# Patient Record
(demographics unavailable — no encounter records)

---

## 2024-11-20 NOTE — DISCUSSION/SUMMARY
[Normal Growth] : growth [Normal Development] : developmental [No Elimination Concerns] : elimination [Continue Regimen] : feeding [No Skin Concerns] : skin [Normal Sleep Pattern] : sleep [Term Infant] : term infant [None] : no known medical problems [Anticipatory Guidance Given] : Anticipatory guidance addressed as per the history of present illness section [ Transition] :  transition [ Care] :  care [Nutritional Adequacy] : nutritional adequacy [Parental Well-Being] : parental well-being [Safety] : safety [No Vaccines] : no vaccines needed [No Medications] : ~He/She~ is not on any medications [Parent/Guardian] : Parent/Guardian [FreeTextEntry1] : Good weight gain since discharge from hospital. Appropriate development.  Referred to OT for left shoulder dystocia (had negative shoulder xray) Moving all extremities without limitation.  Jaundice: Transcutaneous Bili 14.9 - below phototherapy threshold which is 21.1 Ear Pit - No family hx of congenital hearing loss or kidney disease. Feed your baby only breast milk or iron-fortified formula until he is about 6 months old. Feed your baby when he/she is hungry. Look for her/him to put his/her hand to his/her mouth, suck or root, or fuss. Know that your baby is getting enough to eat if he has more than 5 wet diapers and at least 3 soft stools per day and is gaining weight appropriately. HOld your baby so you can look at each other while your feed him/her. Always hold the bottle. Never prop it. Sing, talk and read to your baby, avoid TV, and digital media. Help your baby wake for feeding by patting her/him, changing her/him diaper, and undressing her/him. calm your baby by stroking her head or gently rock her/him. If your child develops a fever take temperature by a rectal thermometer. A fever is a rectal temperature of 100.4F. Call us anytime if you have any questions or concerns. Avoid crowds and keep others from touching your baby without clean hands. Avoid sun exposure. Use a rear-facing only car seat in the back seat of all vehicles. Make sure your baby always stays in his/her car safety seat during travel. If he/she becomes fussy or needs to feed, stop the vehicle, and take him/her out of seat. Always put your baby to sleep on his/her back in his/her own crib, not on your bed. No loose cloth or blankets should be given. Your baby should sleep in your room until he/she is at least 6 months.  If Breastfeeding: - Feed your baby on demand. Expect at least 8 to 12 feedings per day. -A lactation consultant can give you information and support on how to breastfeed your baby and make you more comfortable. -Begin giving your baby vitamin D drops. -Continue your prenatal vitamin with iron. -Eat a healthy diet; avoid fish high in mercury.   If Formula Feeding: - Offer your baby 2oz of formula q 2 to 3 hours. If he/she is still hunger offer him/her more.  [place] Appearance: - [place]'s hands and feet may be bluish in color for a few days.. - [place] may have white spots (pimple-like) on the nose and/ or chin. These are Milia and are due to clogged sweat glands. Do not squeeze.. - [place] may have an elongated or misshapen head. The head is shaped according to the birth canal for easier birth. This is called molding of the head and will round out in a few days.. - Puffy eyes may be due to the birth process or state mandated eye ointment..  [place] Activity: - Wash hands before touching your baby.. - Lay baby on back to sleep: firm mattress, no bumpers, pillows, or things other than a blanket in crib.. - Keep blanket away from the baby's face.. - Bathe with a washcloth until cord falls off and if a boy until circumcision heals.. - Limit visiting for 8 weeks and avoid public places.. - Rear facing car seat in backseat of car properly belted in.. - Support the 's head and hold the baby close.. - It may be easier to cut the 's fingernails when the  is sleeping. Use a file until you can see that the skin is no longer attached to the nail..  Cord Care: - The cord will gradually dry up and fall off in 2-3 weeks.. - Report redness, swelling or drainage from cord  Mother received RSV vaccine more than 2 weeks before birth. Hep B received at [facility]. Met with ABRAHAM Bernard for breastfeeding teaching. RTC at 2 weeks of age for weight check or sooner as needed.

## 2024-11-20 NOTE — PHYSICAL EXAM
[Alert] : alert [Normocephalic] : normocephalic [Flat Open Anterior [place]] : flat open anterior fontanelle [Icteric sclera] : icteric sclera [PERRL] : PERRL [Red Reflex Bilateral] : red reflex bilateral [Normally Placed Ears] : normally placed ears [Auricles Well Formed] : auricles well formed [Clear Tympanic membranes] : clear tympanic membranes [Light reflex present] : light reflex present [Bony structures visible] : bony structures visible [Patent Auditory Canal] : patent auditory canal [Nares Patent] : nares patent [Palate Intact] : palate intact [Uvula Midline] : uvula midline [Supple, full passive range of motion] : supple, full passive range of motion [Symmetric Chest Rise] : symmetric chest rise [Clear to Auscultation Bilaterally] : clear to auscultation bilaterally [Regular Rate and Rhythm] : regular rate and rhythm [S1, S2 present] : S1, S2 present [+2 Femoral Pulses] : +2 femoral pulses [Soft] : soft [Bowel Sounds] : bowel sounds present [Umbilical Stump Dry, Clean, Intact] : umbilical stump dry, clean, intact [Normal external genitalia] : normal external genitalia [Patent Vagina] : patent vagina [Patent] : patent [Normally Placed] : normally placed [No Abnormal Lymph Nodes Palpated] : no abnormal lymph nodes palpated [Symmetric Flexed Extremities] : symmetric flexed extremities [Startle Reflex] : startle reflex present [Suck Reflex] : suck reflex present [Rooting] : rooting reflex present [Palmar Grasp] : palmar grasp present [Plantar Grasp] : plantar reflex present [Symmetric Ayesha] : symmetric [place] [Jaundice] : jaundice [Acute Distress] : no acute distress [Discharge] : no discharge [Palpable Masses] : no palpable masses [Murmurs] : no murmurs [Tender] : nontender [Distended] : not distended [Hepatomegaly] : no hepatomegaly [Splenomegaly] : no splenomegaly [Clitoromegaly] : no clitoromegaly [Valenzuela-Ortolani] : negative Valenzuela-Ortolani [Spinal Dimple] : no spinal dimple [Tuft of Hair] : no tuft of hair [FreeTextEntry3] : small preauricular ear pit

## 2024-11-20 NOTE — HISTORY OF PRESENT ILLNESS
[Born at ___ Wks Gestation] : The patient was born at [unfilled] weeks gestation [(1) _____] : [unfilled] [(5) _____] : [unfilled] [BW: _____] : weight of [unfilled] [Length: _____] : length of [unfilled] [HC: _____] : head circumference of [unfilled] [DW: _____] : Discharge weight was [unfilled] [Time of Birth: _____] : Time of birth was [unfilled] [Age: ___] : [unfilled] year old mother [G: ___] : G [unfilled] [P: ___] : P [unfilled] [Significant Hx: ____] : The mother's  medical history is significant for [unfilled] [Rubella (Immune)] : Rubella immune [MBT: ____] : MBT - [unfilled] [TcB: _____] : Transcutaneous Bilirubin [unfilled] mg/dL [Phototherapy Threshold: _____] : Phototherapy level per Bilitool [unfilled] (mg/dL) [Yes] : Yes [] : via normal spontaneous vaginal delivery [[facility]] : at [facility] [None] : There were no delivery complications [Breast milk] : breast milk [Formula ___ oz/feed] : [unfilled] oz of formula per feed [Hours between feeds ___] : Child is fed every [unfilled] hours [Well-balanced] : well-balanced [Normal] : Normal [___ voids per day] : [unfilled] voids per day [Frequency of stools: ___] : Frequency of stools: [unfilled]  stools [Yellow] : yellow [Seedy] : seedy [In Bassinet/Crib] : sleeps in bassinet/crib [On back] : sleeps on back [No] : No cigarette smoke exposure [Water heater temperature set at <120 degrees F] : Water heater temperature set at <120 degrees F [Rear facing car seat in back seat] : Rear facing car seat in back seat [Carbon Monoxide Detectors] : Carbon monoxide detectors at home [Smoke Detectors] : Smoke detectors at home. [Hepatitis B Vaccine Given] : Hepatitis B vaccine given [Nirsevimab Given] : Nirsevimab given  [NO] : No [RSV vaccine] : RSV vaccine received by mother at least 14 days prior to delivery [HepBsAG] : HepBsAg negative [HIV] : HIV negative [HepC] : Hepatitis C negative [GBS] : GBS negative [VDRL/RPR (Reactive)] : VDRL/RPR nonreactive [] : negative [FreeTextEntry9] : O+ [FreeTextEntry8] : Hospital Course NICU Resuscitation called to LDR for R Shoulder Dystocia Concern for a 39.4 AGA female born via  to a 34 y/o  mother. Maternal hx of anxiety and appendectomy in 2018. Pregnancy uncomplicated. Maternal labs include Blood Type O+, HIV - , RPR NR , Rubella I , Hep B - , GBS neg on 10/23. AROM at 0425 with clear fluids (ROM duration 12H). Baby emerged vigorous, crying, was warmed, dried, suctioned, and stimulated with APGARS of 8/9. No nuchal cord. At 8MOL, baby began experiencing nasal flaring, grunting, and increased WOB. CPAP 5/21% initiated. Trialed off multiple times and at 50MOL, skin to skin initiated and WOB improved. CPAP discontinued at this time, baby stable for  nursery. Mom plans to initiated breastfeeding, consents to Hep B vaccine. Highest maternal temp: 37.0. EOS 0.15.  [de-identified] : 30 [Vitamins ___] : Patient takes no vitamins [Co-sleeping] : no co-sleeping [Loose bedding, pillow, toys, and/or bumpers in crib] : no loose bedding, pillow, toys, and/or bumpers in crib [Pacifier] : Not using pacifier [Exposure to electronic nicotine delivery system] : No exposure to electronic nicotine delivery system [de-identified] : none [FreeTextEntry1] : ACC: [id number] EXAM: XR CLAVICLE COMPLETE LT ORDERED BY: SERGIO HODGE  ACC: [id number] EXAM: XR HUMERUS MIN 2 VIEWS LT ORDERED BY: SERGIO HODGE  PROCEDURE DATE: 11/17/2024    INTERPRETATION: CLINICAL INDICATION: Shoulder dystocia with vertical plexus injury on the left. TECHNIQUE: 2 portable views of the left shoulder, 2 views of the left upper arm,.  COMPARISON: None available.  FINDINGS:  No clavicular fracture or humeral fracture.  Joint spaces are maintained.  Visualized left lung is grossly clear.  IMPRESSION: No fracture of the left humerus or clavicle.  --- End of Report ---

## 2024-12-01 NOTE — HISTORY OF PRESENT ILLNESS
[FreeTextEntry6] : Here for umbilical concern with MOC Umbilical cord fell off very early, first few days Still some oozing and blood No redness/swelling/pain, decreased PO, fevers, URI symptoms Wondering why not healed Feeding well, BF and bottles, takes 2 oz, would take more Gaining 15g/day spit ups NBNB, stools yellow Infant sleeps in bassinet/crib, on back, firm mattress, and without additional loose items. No co-sleeping. Car seat is rear facing in back of the car

## 2024-12-01 NOTE — PHYSICAL EXAM
[Soft] : soft [Tender] : nontender [Distended] : nondistended [Hepatosplenomegaly] : no hepatosplenomegaly [NL] : warm, clear [FreeTextEntry9] : +umbilical granuloma 3 mm, no swelling/erythema

## 2024-12-01 NOTE — DISCUSSION/SUMMARY
[FreeTextEntry1] : 2 week old with open but normal appearing umbilical granuloma. Reviewed benefits/risks of treating, reviewed infection risk if untreated, reviewed risks of silver nitrate, can take 2-4 weeks to heal, if present at 4 weeks requires treatment at that time regardless, in shared decision making MOC elects to hold off on treatment to allow to heal on its own. Go to emergency room for fevers, umbilical redness/swelling/pain. Call office for any concerns.  In regards to feeds,  reviewed breast feeding and supplementation. Do not let baby fall asleep at breast more than once. After baby falls asleep for the second time or baby stops nutritively sucking, can offer bottle of breast milk or formula, or end feed early (then during the next feed the baby's hunger will make them more vigorous). When supplementing, allow baby to take as much as they want.

## 2024-12-04 NOTE — DISCUSSION/SUMMARY
[FreeTextEntry1] : Whitney is an 18d old who presents for a weight check   #health maintenance - continue to offer breastfeeding ad molly - continue vitamin D drops 1x a day - mom received RSV vaccine while she was pregnant   #umbilical drainage - referral made for pediatric surgery to see if there is any deeper structure that is drainage

## 2024-12-04 NOTE — END OF VISIT
[] : Resident [FreeTextEntry3] : Umbilical fell off DOL#4 Still with clear bloody drainage. Mom has to apply gauze because of the drainage. On exam, clear bloody drainage visible but unable to visualize a granuloma to cauterize. Will refer to Peds Surgery - appointment scheduled for 12/5. Has been gaining weight. Primarily breast milk. On Vit D. Mom received RSV ab during pregnancy. F/U at 1 month or sooner prn.

## 2024-12-04 NOTE — PHYSICAL EXAM
[NL] : warm, clear [FreeTextEntry3] : Pit in L ear  [FreeTextEntry9] : umbilical drainage with no visualized granuloma

## 2024-12-04 NOTE — HISTORY OF PRESENT ILLNESS
[de-identified] : weight check  [FreeTextEntry6] : Whitney is a 18d old who presents for a weight check. Gained about 23g per day over the past few days. Mostly breastfeeding about 3.5oz every 3 hours, but mom will sometimes supplement with similac 360. Still has drainage coming from belly button with blood.   Pooping 1-2x a day. Sleeping sometimes 4 hour stretches overnight. Does not use a pacifier yet.   Depression screening was a 0.

## 2024-12-07 NOTE — HISTORY OF PRESENT ILLNESS
[FreeTextEntry1] : Whitney is 19 day old girl here today to be evaluated for an umbilical granuloma. Mom states Whitney's umbilical cord fell off two weeks ago, but there has been persistent bloody drainage from the umbilicus since. They presented to their pediatrician yesterday, who appreciated the granuloma but did not cauterize the site. Mom denies any egress of urine or stool from the site. Whitney is making normal wet diapers and is having a normal daily bowel movements. Mom denies any surrounding skin irritation.

## 2024-12-07 NOTE — CONSULT LETTER
[Dear  ___] : Dear  [unfilled], [Consult Letter:] : I had the pleasure of evaluating your patient, [unfilled]. [Please see my note below.] : Please see my note below. [Consult Closing:] : Thank you very much for allowing me to participate in the care of this patient.  If you have any questions, please do not hesitate to contact me. [Sincerely,] : Sincerely, [FreeTextEntry2] : Narendra Vela  [facility] [address] [place] [address] Phone: [phone] [FreeTextEntry3] : Brian Lassiter MD Division of Pediatric, General, Thoracic and Endoscopic Surgery [facility]

## 2024-12-07 NOTE — CONSULT LETTER
[Dear  ___] : Dear  [unfilled], [Consult Letter:] : I had the pleasure of evaluating your patient, [unfilled]. [Please see my note below.] : Please see my note below. [Consult Closing:] : Thank you very much for allowing me to participate in the care of this patient.  If you have any questions, please do not hesitate to contact me. [Sincerely,] : Sincerely, [FreeTextEntry2] : Narendra Vela  [facility] [address] [place] [address] Phone: [phone] [FreeTextEntry3] : Brian Lassiter MD Division of Pediatric, General, Thoracic and Endoscopic Surgery [facility]

## 2024-12-07 NOTE — ASSESSMENT
[FreeTextEntry1] : Whitney is a 19 day old girl with an umbilical granuloma. She has had persistent bloody drainage from the site but is otherwise asymptomatic. I educated mom and dad about this diagnosis and reviewed the differential diagnosis of umbilical anomalies and reassured them that this most likely represents a granuloma given its appearance and Whitney's symptoms. I discussed the treatment options including cauterization with silver nitrate. I reviewed the risks and benefits of each option, and the family is in agreement to proceed with silver nitrate today. Mom and dad understand this may require repeated cauterizations for the granuloma to completely resolve. I cauterized the granuloma with silver nitrate, which Whitney tolerated very well. I reviewed post-procedure expectations with them including the possibility of temporary skin discoloration. Mom will continue to monitor the site and knows to return to see me should Whitney have ongoing drainage from the site.  She knows to contact me as well with any further questions or concerns.

## 2024-12-07 NOTE — REASON FOR VISIT
[Initial - Scheduled] : an initial, scheduled visit with concerns of [Other: ____] : [unfilled] [Patient] : patient [Parents] : parents [FreeTextEntry4] : Dr Narendra Vela

## 2024-12-07 NOTE — ADDENDUM
[FreeTextEntry1] : Documented by Tito Frias acting as a scribe for Dr. Lassiter on 12/05/2024.   All medical record entries made by the Scribe were at my, Dr. Lassiter, direction and personally dictated by me on 12/05/2024. I have reviewed the chart and agree that the record accurately reflects my personal performances of the history, physical exam, assessment and plan. I have also personally directed, reviewed, and agree with the instructions.

## 2024-12-18 NOTE — PHYSICAL EXAM
[Alert] : alert [Normocephalic] : normocephalic [Flat Open Anterior [place]] : flat open anterior fontanelle [PERRL] : PERRL [Red Reflex Bilateral] : red reflex bilateral [Normally Placed Ears] : normally placed ears [Auricles Well Formed] : auricles well formed [Nares Patent] : nares patent [Palate Intact] : palate intact [Supple, full passive range of motion] : supple, full passive range of motion [Symmetric Chest Rise] : symmetric chest rise [Clear to Auscultation Bilaterally] : clear to auscultation bilaterally [Regular Rate and Rhythm] : regular rate and rhythm [S1, S2 present] : S1, S2 present [Soft] : soft [Bowel Sounds] : bowel sounds present [Normal external genitailia] : normal external genitalia [Normally Placed] : normally placed [Suck Reflex] : suck reflex present [Palmar Grasp] : palmar grasp reflex present [Plantar Grasp] : plantar grasp reflex present [Symmetric Ayesha] : symmetric [place] [Acute Distress] : no acute distress [Discharge] : no discharge [Palpable Masses] : no palpable masses [Murmurs] : no murmurs [Tender] : nontender [Distended] : not distended [Hepatomegaly] : no hepatomegaly [Splenomegaly] : no splenomegaly [Clavicular Crepitus] : no clavicular crepitus [Valenzuela-Ortolani] : negative Valenzuela-Ortolani [Spinal Dimple] : no spinal dimple [Tuft of Hair] : no tuft of hair [Jaundice] : no jaundice [Rash and/or lesion present] : no rash/lesion

## 2024-12-18 NOTE — PHYSICAL EXAM
[Alert] : alert [Normocephalic] : normocephalic [Flat Open Anterior [place]] : flat open anterior fontanelle [PERRL] : PERRL [Red Reflex Bilateral] : red reflex bilateral [Normally Placed Ears] : normally placed ears [Auricles Well Formed] : auricles well formed [Nares Patent] : nares patent [Palate Intact] : palate intact [Supple, full passive range of motion] : supple, full passive range of motion [Symmetric Chest Rise] : symmetric chest rise [Clear to Auscultation Bilaterally] : clear to auscultation bilaterally [Regular Rate and Rhythm] : regular rate and rhythm [S1, S2 present] : S1, S2 present [Soft] : soft [Bowel Sounds] : bowel sounds present [Normal external genitailia] : normal external genitalia [Normally Placed] : normally placed [Suck Reflex] : suck reflex present [Palmar Grasp] : palmar grasp reflex present [Plantar Grasp] : plantar grasp reflex present [Symmetric Ayesha] : symmetric [place] [Acute Distress] : no acute distress [Discharge] : no discharge [Palpable Masses] : no palpable masses [Murmurs] : no murmurs [Tender] : nontender [Distended] : not distended [Hepatomegaly] : no hepatomegaly [Splenomegaly] : no splenomegaly [Clavicular Crepitus] : no clavicular crepitus [Valenzuela-Ortolani] : negative Valenzuela-Ortolani [Spinal Dimple] : no spinal dimple [Tuft of Hair] : no tuft of hair [Jaundice] : no jaundice [Rash and/or lesion present] : no rash/lesion

## 2024-12-18 NOTE — REVIEW OF SYSTEMS
[Eye Discharge] : eye discharge [Spitting Up] : spitting up [Eye Redness] : no eye redness [Tachypnea] : not tachypneic [Cough] : no cough [Intolerance to feeds] : tolerance to feeds [Constipation] : no constipation [Vomiting] : no vomiting [Diarrhea] : no diarrhea [Abnormal Movements] :  no abnormal movements [Jaundice] : no jaundice [Rash] : no rash

## 2024-12-18 NOTE — DISCUSSION/SUMMARY
[Normal Growth] : growth [Normal Development] : development  [No Elimination Concerns] : elimination [Continue Regimen] : feeding [No Skin Concerns] : skin [Normal Sleep Pattern] : sleep [Term Infant] : term infant [None] : no medical problems [Parental Well-Being] : parental well-being [Family Adjustment] : family adjustment [Feeding Routines] : feeding routines [Infant Adjustment] : infant adjustment [Safety] : safety [Mother] : mother [Parental Concerns Addressed] : Parental concerns addressed [FreeTextEntry1] : Whitney is a 30do female presenting for 1 month-visit. Baby has been doing well,  and supplementing with Similac, gaining 40g/day and meeting appropriate developmental milestones.   #Umbilical granuloma - Cauterized by surgery on 12/5/24 - Significantly improved with no drainage/opening  #Health maintenance - RTC in 1 month for 2 month visit - Discussed 2 month vaccines- parent agreeable  #Anticipatory Guidance - When in car, patient should be in rear-facing car seat in back seat.  - Put baby to sleep on back, in own crib with no loose or soft bedding.  - Help baby to develop sleep and feeding routines.  - Start tummy time when awake. - Limit baby's exposure to others, especially those with fever or unknown vaccine status.  - Parents counseled to call if rectal temperature >100.4 degrees F. - Parental concerns addressed.

## 2024-12-18 NOTE — PHYSICAL EXAM
[Alert] : alert [Normocephalic] : normocephalic [Flat Open Anterior [place]] : flat open anterior fontanelle [PERRL] : PERRL [Red Reflex Bilateral] : red reflex bilateral [Normally Placed Ears] : normally placed ears [Auricles Well Formed] : auricles well formed [Nares Patent] : nares patent [Palate Intact] : palate intact [Supple, full passive range of motion] : supple, full passive range of motion [Symmetric Chest Rise] : symmetric chest rise [Clear to Auscultation Bilaterally] : clear to auscultation bilaterally [Regular Rate and Rhythm] : regular rate and rhythm [S1, S2 present] : S1, S2 present [Soft] : soft [Bowel Sounds] : bowel sounds present [Normal external genitailia] : normal external genitalia [Normally Placed] : normally placed [Suck Reflex] : suck reflex present [Palmar Grasp] : palmar grasp reflex present [Plantar Grasp] : plantar grasp reflex present [Symmetric Ayesha] : symmetric [place] [Acute Distress] : no acute distress [Discharge] : no discharge [Palpable Masses] : no palpable masses [Murmurs] : no murmurs [Tender] : nontender [Distended] : not distended [Hepatomegaly] : no hepatomegaly [Splenomegaly] : no splenomegaly [Clavicular Crepitus] : no clavicular crepitus [Valenzuela-Ortolani] : negative Valenzuela-Ortolani [Spinal Dimple] : no spinal dimple [Tuft of Hair] : no tuft of hair [Jaundice] : no jaundice [Rash and/or lesion present] : no rash/lesion

## 2024-12-18 NOTE — HISTORY OF PRESENT ILLNESS
[Breast milk] : breast milk [Formula ___ oz/feed] : [unfilled] oz of formula per feed [Hours between feeds ___] : Child is fed every [unfilled] hours [Vitamins ___] : Patient takes [unfilled] vitamins daily [Normal] : Normal [___ voids per day] : [unfilled] voids per day [Frequency of stools: ___] : Frequency of stools: [unfilled]  stools [per day] : per day. [Green/brown] : green/brown [Yellow] : yellow [In Bassinet/Crib] : sleeps in bassinet/crib [On back] : sleeps on back [Water heater temperature set at <120 degrees F] : Water heater temperature set at <120 degrees F [Rear facing car seat in back seat] : Rear facing car seat in back seat [Carbon Monoxide Detectors] : Carbon monoxide detectors at home [Smoke Detectors] : Smoke detectors at home. [Mother] : mother [Co-sleeping] : no co-sleeping [Loose bedding, pillow, toys, and/or bumpers in crib] : no loose bedding, pillow, toys, and/or bumpers in crib [Pacifier use] : not using pacifier [de-identified] : Grandfather, brother [de-identified] : spitting up after some feeds, Similac 360, mom feels she has decresed supply so she puts baby to breast first and then gives formula [FreeTextEntry1] : Whitney was seen by surgery last week for umbilical granuloma open and drainage and had it cauterized.  Mom takes labetalol for hypertension. Mom is returning to work after [place] and baby and 2-year-old brother Ej will be babysat by grandparents.

## 2024-12-18 NOTE — HISTORY OF PRESENT ILLNESS
[Breast milk] : breast milk [Formula ___ oz/feed] : [unfilled] oz of formula per feed [Hours between feeds ___] : Child is fed every [unfilled] hours [Vitamins ___] : Patient takes [unfilled] vitamins daily [Normal] : Normal [___ voids per day] : [unfilled] voids per day [Frequency of stools: ___] : Frequency of stools: [unfilled]  stools [per day] : per day. [Green/brown] : green/brown [Yellow] : yellow [In Bassinet/Crib] : sleeps in bassinet/crib [On back] : sleeps on back [Water heater temperature set at <120 degrees F] : Water heater temperature set at <120 degrees F [Rear facing car seat in back seat] : Rear facing car seat in back seat [Carbon Monoxide Detectors] : Carbon monoxide detectors at home [Smoke Detectors] : Smoke detectors at home. [Mother] : mother [Co-sleeping] : no co-sleeping [Loose bedding, pillow, toys, and/or bumpers in crib] : no loose bedding, pillow, toys, and/or bumpers in crib [Pacifier use] : not using pacifier [de-identified] : Grandfather, brother [de-identified] : spitting up after some feeds, Similac 360, mom feels she has decresed supply so she puts baby to breast first and then gives formula [FreeTextEntry1] : Whitney was seen by surgery last week for umbilical granuloma open and drainage and had it cauterized.  Mom takes labetalol for hypertension. Mom is returning to work after [place] and baby and 2-year-old brother Ej will be babysat by grandparents.

## 2024-12-18 NOTE — HISTORY OF PRESENT ILLNESS
[Breast milk] : breast milk [Formula ___ oz/feed] : [unfilled] oz of formula per feed [Hours between feeds ___] : Child is fed every [unfilled] hours [Vitamins ___] : Patient takes [unfilled] vitamins daily [Normal] : Normal [___ voids per day] : [unfilled] voids per day [Frequency of stools: ___] : Frequency of stools: [unfilled]  stools [per day] : per day. [Green/brown] : green/brown [Yellow] : yellow [In Bassinet/Crib] : sleeps in bassinet/crib [On back] : sleeps on back [Water heater temperature set at <120 degrees F] : Water heater temperature set at <120 degrees F [Rear facing car seat in back seat] : Rear facing car seat in back seat [Carbon Monoxide Detectors] : Carbon monoxide detectors at home [Smoke Detectors] : Smoke detectors at home. [Mother] : mother [Co-sleeping] : no co-sleeping [Loose bedding, pillow, toys, and/or bumpers in crib] : no loose bedding, pillow, toys, and/or bumpers in crib [Pacifier use] : not using pacifier [de-identified] : Grandfather, brother [de-identified] : spitting up after some feeds, Similac 360, mom feels she has decresed supply so she puts baby to breast first and then gives formula [FreeTextEntry1] : Whitney was seen by surgery last week for umbilical granuloma open and drainage and had it cauterized.  Mom takes labetalol for hypertension. Mom is returning to work after [place] and baby and 2-year-old brother Ej will be babysat by grandparents.

## 2024-12-20 NOTE — BIRTH HISTORY
[At ___ Weeks Gestation] : at [unfilled] weeks gestation [[place] [Normal Vaginal Route] : by normal vaginal route [de-identified] : APGAR 8, 9 [FreeTextEntry4] : left shoulder dystocia [FreeTextEntry6] : none

## 2024-12-20 NOTE — ASSESSMENT
[FreeTextEntry1] : 34 day old ex full term infant with left shoulder dystocia at the time of delivery. Neurologic examination as above with good tone, strength, and DTRs in the left upper extremity. No additional workup indicated. Continue with therapies and exercises - per mother she appreciated slight difference in arms.

## 2024-12-20 NOTE — HISTORY OF PRESENT ILLNESS
[FreeTextEntry1] : Interim Hx Since hospital discharge family noted improvement in arm strength within the first week of life Mother without acute concerns at today's visit  Services:  PT 1x/week Home exercises 1-2 x/day  HPI per EMR NICU Resuscitation called to LDR for L Shoulder Dystocia Concern for a 39.4 AGA female born via  to a 34 y/o  mother. Maternal hx of anxiety and appendectomy in 2018. Also with hx of PE, on lovenox during pregnancy. Pregnancy uncomplicated. Maternal labs include Blood Type O+, HIV - , RPR NR , Rubella I , Hep B - , GBS neg on 10/23. AROM at 0425 with clear fluids (ROM duration 12H). Baby emerged vigorous, crying, was warmed, dried, suctioned, and stimulated with APGARS of 8/9. No nuchal cord. At 8MOL, baby began experiencing nasal flaring, grunting, and increased WOB. CPAP 5/21% initiated. Trialed off multiple times and at 50MOL, skin to skin initiated and WOB improved. CPAP discontinued at this time, baby stable for  nursery. Mom plans to initiated breastfeeding, consents to Hep B vaccine. Highest maternal temp: 37.0. EOS 0.15.  GENERAL PHYSICAL EXAM Gen: No acute distress; well-appearing HEENT: Normocephalic, atraumatic; anterior fontanelle open and flat; ears and nose normally set; no tags; oropharynx clear Skin: pink, no neurocutaneous stigmata Resp: Even, non-labored breathing Cardiac: Warm and well perfused, 2+ femoral pulses bilaterally Abd: Soft, nontender, nondistended Extremities: Full range of motion; no clavicular crepitus : Catracho I; no abnormalities; no hernia; anus patent, no sacral dimple Neuro: Opens eyes to stimulation, EOMI. No facial asymmetry, no ptosis, PERRL. +spontaneous movement of all extremities though decreased movement of LUE. +Decreased strength of LUE. Reflexes including brachioradialis, biceps, triceps, and palmar 2+ bilaterally. +Ayesha but on equal with decreased on L  Imaging Studies: XR of clavicle, humerus: IMPRESSION: No fracture of the left humerus or clavicle.

## 2024-12-20 NOTE — PHYSICAL EXAM
[Well-appearing] : well-appearing [Normocephalic] : normocephalic [Anterior fontanel- Open] : anterior fontanel- open [Anterior fontanel- Soft] : anterior fontanel- soft [Anterior fontanel- Flat] : anterior fontanel- flat [No dysmorphic facial features] : no dysmorphic facial features [No ocular abnormalities] : no ocular abnormalities [Neck supple] : neck supple [Soft] : soft [Straight] : straight [No deformities] : no deformities [Alert] : alert [Pupils reactive to light] : pupils reactive to light [Turns to light] : turns to light [Tracks face, light or objects with full extraocular movements] : tracks face, light or objects with full extraocular movements [No facial asymmetry or weakness] : no facial asymmetry or weakness [No nystagmus] : no nystagmus [Responds to voice/sounds] : responds to voice/sounds [Midline tongue] : midline tongue [No fasciculations] : no fasciculations [Normal axial and appendicular muscle tone with symmetric limb movements] : normal axial and appendicular muscle tone with symmetric limb movements [Normal bulk] : normal bulk [Good  bilaterally] : good  bilaterally [No abnormal involuntary movements] : no abnormal involuntary movements [2+ biceps] : 2+ biceps [Knee jerks] : knee jerks [Ankle jerks] : ankle jerks [Grasp] : grasp [Responds to touch and tickle] : responds to touch and tickle [de-identified] : no resp distress, no retractions  [de-identified] : full ROM in all extremities [de-identified] : moving all extremities symmetrically and against gravity

## 2024-12-20 NOTE — REASON FOR VISIT
[Initial Consultation] : an initial consultation for [Mother] : mother [FreeTextEntry2] : asymmetric [place] reflex

## 2025-01-22 NOTE — HISTORY OF PRESENT ILLNESS
[Parents] : parents [Breast milk] : breast milk [Formula ___ oz/feed] : [unfilled] oz of formula per feed [Normal] : Normal [___ voids per day] : [unfilled] voids per day [Frequency of stools: ___] : Frequency of stools: [unfilled]  stools [In Bassinet/Crib] : sleeps in bassinet/crib [On back] : sleeps on back [Pacifier use] : Pacifier use [No] : No cigarette smoke exposure [Rear facing car seat in back seat] : Rear facing car seat in back seat [Smoke Detectors] : Smoke detectors at home. [Co-sleeping] : no co-sleeping [Exposure to electronic nicotine delivery system] : No exposure to electronic nicotine delivery system [FreeTextEntry7] : Patient has been doing well, no recent illness, no ED visits, no known sick contacts and no recent travel. Receiving PT for shoulder dystocia [de-identified] : Diaper rash

## 2025-01-22 NOTE — DISCUSSION/SUMMARY
[FreeTextEntry1] : Healthy 2 month old infant here for WCC Growing and developing well Receiving PT for shoulder dystocia, see by Neuro in Dec, follow up PRN She is doing much better, mobility has improved Has candidal diaper rash Apply antifungal to affected area 3-4 times daily for 2 weeks, sent to pharmacy Side effect of topical antifungal includes but not limited to worsening erythema. Recommend zinc oxide with every diaper change. Change diaper frequently and allow for periods of no diaper wearing to promote dryness. If no improvement return to office. [place]  Depression Scale used and negative Vaccines today including HepB#2, DTaP#1, IPV#1, Hib#1, PCV20#1, RV#1 - tolerated well Fever precautions reviewed, discussed importance of rectal thermometer, limit baby's exposure to others, especially those with fever or unknown vaccine status. Parents counseled to call if rectal temperature >100.4 degrees F. Recommended exclusive breastfeeding, 8-12 feedings per day. Mother should continue prenatal vitamins and avoid alcohol. If formula is needed, recommend iron-fortified formulations, 2-4 oz every 3-4 hrs. When in car, patient should be in rear-facing car seat in back seat. Put baby to sleep on back, in own crib with no loose or soft bedding. Help baby to develop sleep and feeding routines. May offer pacifier if needed. Continue tummy time when awake and alternate head of crib. Discussed Colic Introducing solids at 4-6mo age, no honey until 12mo age Vaccine readiness: Next vaccines at age 4 months. Parents agree and consent. Follow-up in 2 month for 4-month WCC or sooner if parents have concerns.

## 2025-01-22 NOTE — PHYSICAL EXAM
[Alert] : alert [Normocephalic] : normocephalic [Flat Open Anterior [place]] : flat open anterior fontanelle [PERRL] : PERRL [Red Reflex Bilateral] : red reflex bilateral [Normally Placed Ears] : normally placed ears [Auricles Well Formed] : auricles well formed [Clear Tympanic membranes] : clear tympanic membranes [Light reflex present] : light reflex present [Bony landmarks visible] : bony landmarks visible [Nares Patent] : nares patent [Palate Intact] : palate intact [Uvula Midline] : uvula midline [Supple, full passive range of motion] : supple, full passive range of motion [Symmetric Chest Rise] : symmetric chest rise [Clear to Auscultation Bilaterally] : clear to auscultation bilaterally [Regular Rate and Rhythm] : regular rate and rhythm [S1, S2 present] : S1, S2 present [+2 Femoral Pulses] : +2 femoral pulses [Soft] : soft [Bowel Sounds] : bowel sounds present [Normal external genitailia] : normal external genitalia [Patent Vagina] : vagina patent [Normally Placed] : normally placed [Symmetric Flexed Extremities] : symmetric flexed extremities [Startle Reflex] : startle reflex present [Suck Reflex] : suck reflex present [Rooting] : rooting reflex present [Palmar Grasp] : palmar grasp reflex present [Plantar Grasp] : plantar grasp reflex present [Symmetric Ayesha] : symmetric [place] [Rash and/or lesion present] : rash and/or lesion present [Acute Distress] : no acute distress [Discharge] : no discharge [Palpable Masses] : no palpable masses [Murmurs] : no murmurs [Tender] : nontender [Distended] : not distended [Hepatomegaly] : no hepatomegaly [Splenomegaly] : no splenomegaly [Clitoromegaly] : no clitoromegaly [Valenzuela-Ortolani] : negative Valenzuela-Ortolani [Spinal Dimple] : no spinal dimple [Tuft of Hair] : no tuft of hair [de-identified] : erythema over vulva and diaper region

## 2025-01-22 NOTE — DISCUSSION/SUMMARY
[FreeTextEntry1] : Healthy 2 month old infant here for WCC Growing and developing well Receiving PT for shoulder dystocia, see by Neuro in Dec, follow up PRN She is doing much better, mobility has improved Has candidal diaper rash Apply antifungal to affected area 3-4 times daily for 2 weeks, sent to pharmacy Side effect of topical antifungal includes but not limited to worsening erythema. Recommend zinc oxide with every diaper change. Change diaper frequently and allow for periods of no diaper wearing to promote dryness. If no improvement return to office. [place]  Depression Scale used and negative Vaccines today including HepB#2, DTaP#1, IPV#1, Hib#1, PCV20#1, RV#1 - tolerated well Fever precautions reviewed, discussed importance of rectal thermometer, limit baby's exposure to others, especially those with fever or unknown vaccine status. Parents counseled to call if rectal temperature >100.4 degrees F. Recommended exclusive breastfeeding, 8-12 feedings per day. Mother should continue prenatal vitamins and avoid alcohol. If formula is needed, recommend iron-fortified formulations, 2-4 oz every 3-4 hrs. When in car, patient should be in rear-facing car seat in back seat. Put baby to sleep on back, in own crib with no loose or soft bedding. Help baby to develop sleep and feeding routines. May offer pacifier if needed. Continue tummy time when awake and alternate head of crib. Discussed Colic Introducing solids at 4-6mo age, no honey until 12mo age Vaccine readiness: Next vaccines at age 4 months. Parents agree and consent. Follow-up in 2 month for 4-month WCC or sooner if parents have concerns.

## 2025-01-22 NOTE — PHYSICAL EXAM
[Alert] : alert [Normocephalic] : normocephalic [Flat Open Anterior [place]] : flat open anterior fontanelle [PERRL] : PERRL [Red Reflex Bilateral] : red reflex bilateral [Normally Placed Ears] : normally placed ears [Auricles Well Formed] : auricles well formed [Clear Tympanic membranes] : clear tympanic membranes [Light reflex present] : light reflex present [Bony landmarks visible] : bony landmarks visible [Nares Patent] : nares patent [Palate Intact] : palate intact [Uvula Midline] : uvula midline [Supple, full passive range of motion] : supple, full passive range of motion [Symmetric Chest Rise] : symmetric chest rise [Clear to Auscultation Bilaterally] : clear to auscultation bilaterally [Regular Rate and Rhythm] : regular rate and rhythm [S1, S2 present] : S1, S2 present [+2 Femoral Pulses] : +2 femoral pulses [Soft] : soft [Bowel Sounds] : bowel sounds present [Normal external genitailia] : normal external genitalia [Patent Vagina] : vagina patent [Normally Placed] : normally placed [Symmetric Flexed Extremities] : symmetric flexed extremities [Startle Reflex] : startle reflex present [Suck Reflex] : suck reflex present [Rooting] : rooting reflex present [Palmar Grasp] : palmar grasp reflex present [Plantar Grasp] : plantar grasp reflex present [Symmetric Ayesha] : symmetric [place] [Rash and/or lesion present] : rash and/or lesion present [Acute Distress] : no acute distress [Discharge] : no discharge [Palpable Masses] : no palpable masses [Murmurs] : no murmurs [Tender] : nontender [Distended] : not distended [Hepatomegaly] : no hepatomegaly [Splenomegaly] : no splenomegaly [Clitoromegaly] : no clitoromegaly [Valenzuela-Ortolani] : negative Valenzuela-Ortolani [Spinal Dimple] : no spinal dimple [Tuft of Hair] : no tuft of hair [de-identified] : erythema over vulva and diaper region

## 2025-01-22 NOTE — HISTORY OF PRESENT ILLNESS
[Parents] : parents [Breast milk] : breast milk [Formula ___ oz/feed] : [unfilled] oz of formula per feed [Normal] : Normal [___ voids per day] : [unfilled] voids per day [Frequency of stools: ___] : Frequency of stools: [unfilled]  stools [In Bassinet/Crib] : sleeps in bassinet/crib [On back] : sleeps on back [Pacifier use] : Pacifier use [No] : No cigarette smoke exposure [Rear facing car seat in back seat] : Rear facing car seat in back seat [Smoke Detectors] : Smoke detectors at home. [Co-sleeping] : no co-sleeping [Exposure to electronic nicotine delivery system] : No exposure to electronic nicotine delivery system [FreeTextEntry7] : Patient has been doing well, no recent illness, no ED visits, no known sick contacts and no recent travel. Receiving PT for shoulder dystocia [de-identified] : Diaper rash

## 2025-02-10 NOTE — PHYSICAL EXAM
[No Acute Distress] : acute distress [Alert] : alert [Tired appearing] : not tired appearing [Lethargic] : not lethargic [Irritable] : not irritable [Playful] : playful [Normocephalic] : normocephalic [[place]] : fontanelle flat [Discharge] : no discharge [Eyelid Swelling] : no eyelid swelling [Pink Nasal Mucosa] : pink nasal mucosa [Clear Rhinorrhea] : clear rhinorrhea [Mucoid Discharge] : no mucoid discharge [Nasal Flaring] : no nasal flaring [Erythematous Oropharynx] : nonerythematous oropharynx [Clear to Auscultation Bilaterally] : clear to auscultation bilaterally [Wheezing] : no wheezing [Rales] : no rales [Crackles] : no crackles [Transmitted Upper Airway Sounds] : no transmitted upper airway sounds [Rhonchi] : no rhonchi [Subcostal Retractions] : no subcostal retractions [Soft] : soft [Tender] : nontender [Distended] : nondistended [Normal Bowel Sounds] : normal bowel sounds [Hepatosplenomegaly] : no hepatosplenomegaly [NL] : warm, clear [de-identified] : moist mucus membranes, blowing bubbles with saliva

## 2025-02-10 NOTE — HISTORY OF PRESENT ILLNESS
[de-identified] : nasal congestion [FreeTextEntry6] : Whitney is a 2mo female presenting today for 1 day nasal congestion. Patient's older brother with URI symptoms which began on Thursday, found to be flu positive. This morning the patient had nasal discharge. Rectal temp at the time was 100.2. Patient with good PO intake today. Made 4-5 wet diapers today. No increased WOB, cough, vomiting, rash, diarrhea, or fever.

## 2025-02-10 NOTE — PHYSICAL EXAM
[No Acute Distress] : acute distress [Alert] : alert [Tired appearing] : not tired appearing [Lethargic] : not lethargic [Irritable] : not irritable [Playful] : playful [Normocephalic] : normocephalic [[place]] : fontanelle flat [Discharge] : no discharge [Eyelid Swelling] : no eyelid swelling [Pink Nasal Mucosa] : pink nasal mucosa [Clear Rhinorrhea] : clear rhinorrhea [Mucoid Discharge] : no mucoid discharge [Nasal Flaring] : no nasal flaring [Erythematous Oropharynx] : nonerythematous oropharynx [Clear to Auscultation Bilaterally] : clear to auscultation bilaterally [Wheezing] : no wheezing [Rales] : no rales [Crackles] : no crackles [Transmitted Upper Airway Sounds] : no transmitted upper airway sounds [Rhonchi] : no rhonchi [Subcostal Retractions] : no subcostal retractions [Soft] : soft [Tender] : nontender [Distended] : nondistended [Normal Bowel Sounds] : normal bowel sounds [Hepatosplenomegaly] : no hepatosplenomegaly [NL] : warm, clear [de-identified] : moist mucus membranes, blowing bubbles with saliva

## 2025-02-10 NOTE — HISTORY OF PRESENT ILLNESS
[de-identified] : nasal congestion [FreeTextEntry6] : Whitney is a 2mo female presenting today for 1 day nasal congestion. Patient's older brother with URI symptoms which began on Thursday, found to be flu positive. This morning the patient had nasal discharge. Rectal temp at the time was 100.2. Patient with good PO intake today. Made 4-5 wet diapers today. No increased WOB, cough, vomiting, rash, diarrhea, or fever.

## 2025-02-10 NOTE — DISCUSSION/SUMMARY
[FreeTextEntry1] : Whitney is a 2mo female with no PMH presenting for 1 day of nasal discharge in the setting of flu exposure. Patient well appearing on exam, well hydrated, no increased WOB, no meningeal signs, afebrile. No acute interventions needed at this time. Likely viral etiology given acute exposure, no need for viral swab at this time as will not . Anticipatory guidance given. Supportive care discussed. Return precautions reviewed with MOC over the phone and with grandmother in the office.

## 2025-03-04 NOTE — PHYSICAL EXAM
[NL] : normotonic [FreeTextEntry4] : + congestion [de-identified] : MMM [de-identified] : Noted to have eczema patches on side of face and cheek, dry erythematous patches noted on posterior trunk

## 2025-03-04 NOTE — HISTORY OF PRESENT ILLNESS
[de-identified] : Cough, congestion  [FreeTextEntry6] : Whitney is a 3-month-old F coming in for acute visit for cough, nasal congestion:   Seen yesterday in clinic for 1 day of cough and nasal congestion, diagnosed with viral infection likely due to exposure from sibling. Cough and nasal congestion x 2 days Sibling with similar sick symptoms No fever, no emesis or diarrhea PO intake at baseline, taking longer to finish feeds UOP and bowel movements at baseline.

## 2025-03-04 NOTE — DISCUSSION/SUMMARY
[FreeTextEntry1] : Whitney is a 3 month old F coming in for acute visit for nasal congestion and cough x 2 days. No fevers. Symptoms likely due to viral URI. Recommend supportive are: antipyretics as needed for fever, encourage fluid intake and monitor hydration status, for nasal congestion -  use nasal saline followed by nasal suction, humidifier in room, and can breathe in steam from shower. Nebulizer machine sent to pharmacy. Return if symptoms worsen or persist. Reviewed that if having respiratory distress, not having at least 2 urinary outputs in 24 hours, or not able to take anything by mouth - needs to be seen in ED immediately.   Rash consistent with eczema. Seen by dermatologist yesterday. Reviewed sensitive skin care extensively. For moisturizer - recommend using Cetaphil, Cerave, Aquaphor, or Vaseline 3-5x per day especially after shower. For body wash - recommend using sensitive and fragrance free body washes. For detergent - recommend All or Tide sensitive detergent. Recommend spacing out bathing to once every 2 days if possible. Return if symptoms or persisting or worsening.

## 2025-03-04 NOTE — PHYSICAL EXAM
[NL] : normotonic [FreeTextEntry4] : + congestion [de-identified] : MMM [de-identified] : Noted to have eczema patches on side of face and cheek, dry erythematous patches noted on posterior trunk

## 2025-03-04 NOTE — HISTORY OF PRESENT ILLNESS
[de-identified] : Cough, congestion  [FreeTextEntry6] : Whitney is a 3-month-old F coming in for acute visit for cough, nasal congestion:   Seen yesterday in clinic for 1 day of cough and nasal congestion, diagnosed with viral infection likely due to exposure from sibling. Cough and nasal congestion x 2 days Sibling with similar sick symptoms No fever, no emesis or diarrhea PO intake at baseline, taking longer to finish feeds UOP and bowel movements at baseline.

## 2025-03-06 NOTE — DISCUSSION/SUMMARY
[FreeTextEntry1] : Whitney is a 3mo female with no PMH presenting for 1 day of cough and congestion. Brother with similar symptoms. Patient well appearing on exam, well hydrated, no increased WOB, no meningeal signs, afebrile. No acute interventions needed at this time.  Likely viral etiology given acute exposure, no need for viral swab at this time as will not . Anticipatory guidance given. Supportive care discussed. Return precautions and signs of respiratory distress and maintaining adequate hydration reviewed with grandparents and aunt.

## 2025-03-06 NOTE — PHYSICAL EXAM
[No Acute Distress] : acute distress [Alert] : alert [Playful] : playful [Normocephalic] : normocephalic [EOMI] : grossly EOMI [Clear to Auscultation Bilaterally] : clear to auscultation bilaterally [Regular Rate and Rhythm] : regular rate and rhythm [Normal S1, S2 audible] : normal S1, S2 audible [Soft] : soft [Moves All Extremities x 4] : moves all extremities x4 [Warm, Well Perfused x4] : warm, well perfused x4 [Capillary Refill <2s] : capillary refill < 2s [Warm] : warm [Clear] : clear [Wheezing] : no wheezing [Rales] : no rales [Crackles] : no crackles [Transmitted Upper Airway Sounds] : no transmitted upper airway sounds [Tachypnea] : no tachypnea [Rhonchi] : no rhonchi [Subcostal Retractions] : no subcostal retractions [Suprasternal Retractions] : no suprasternal retractions [Murmurs] : no murmurs [Tender] : nontender [Distended] : nondistended

## 2025-03-06 NOTE — HISTORY OF PRESENT ILLNESS
[FreeTextEntry6] : 3mo female presenting with 1d of cough and congestion. Grandparents deny signs of increased work of breathing. Making WD and feeding at baseline. No fevers, vomiting, diarrhea, difficulty breathing.  Older brother Ej also sick with similar symptoms.

## 2025-03-06 NOTE — REVIEW OF SYSTEMS
[Nasal Discharge] : nasal discharge [Nasal Congestion] : nasal congestion [Cough] : cough [Congestion] : congestion [Fever] : no fever [Tachypnea] : not tachypneic [Vomiting] : no vomiting [Diarrhea] : no diarrhea [Rash] : no rash

## 2025-03-19 NOTE — DISCUSSION/SUMMARY
[Normal Growth] : growth [Normal Development] : development  [No Elimination Concerns] : elimination [Continue Regimen] : feeding [No Skin Concerns] : skin [Normal Sleep Pattern] : sleep [Term Infant] : term infant [Anticipatory Guidance Given] : Anticipatory guidance addressed as per the history of present illness section [Family Functioning] : family functioning [Nutritional Adequacy and Growth] : nutritional adequacy and growth [Infant Development] : infant development [Oral Health] : oral health [Safety] : safety [Age Approp Vaccines] : Age appropriate vaccines administered [No Medication Changes] : No medication changes at this time [Mother] : mother [] : The components of the vaccine(s) to be administered today are listed in the plan of care. The disease(s) for which the vaccine(s) are intended to prevent and the risks have been discussed with the caretaker.  The risks are also included in the appropriate vaccination information statements which have been provided to the patient's caregiver.  The caregiver has given consent to vaccinate. [de-identified] : grandparents [FreeTextEntry1] : Whitney is a healthy 4m old F, hx of shoulder dystocia, here for her C. She is growing and developing well, starting to roll over. Exam s/f symmetric strength in bilateral arms and positional plagiocephaly, advised to increase tummy time. Recommend no more swaddling as baby starting to roll. Continue vitamin D, continue formula, no solids yet. Continue skin care regimen as per dermatology. Continue PT.  vaccines today - Pentacel ([id number]), PCV, Rotavirus.  PP depression screen negative.  RTC in 2 months or sooner as needed.  Recommend breastfeeding, 8-12 feedings per day. Mother should continue prenatal vitamins and avoid alcohol. If formula is needed, recommend iron-fortified formulations, 2-4 oz every 3-4 hrs. Cereal may be introduced using a spoon and bowl. When in car, patient should be in rear-facing car seat in back seat. Put baby to sleep on back, in own crib with no loose or soft bedding. Lower crib matress. Help baby to maintain sleep and feeding routines. May offer pacifier if needed. Continue tummy time when awake.

## 2025-03-19 NOTE — DEVELOPMENTAL MILESTONES
[Normal Development] : Normal Development [None] : none [Laughs aloud] : laughs aloud [Turns to voice] : turns to voice [Vocalizes with extending cooing] : vocalizes with extending cooing [Rolls over prone to supine] : rolls over prone to supine [Supports on elbows & wrists in prone] : supports on elbows and wrists in prone [Keeps hands unfisted] : keeps hands unfisted [Plays with fingers in midline] : plays with fingers in midline [Grasps objects] : grasps objects [FreeTextEntry1] : Still receives PT for L shoulder dystocia with notable improvement in L arm movement and strength [Passed] : passed [FreeTextEntry2] : 1

## 2025-03-19 NOTE — REVIEW OF SYSTEMS
[Negative] : Genitourinary [Inconsolable] : consolable [Fussy] : not fussy [Dysconjugate gaze] : no dysconjugate gaze [Mouth Breathing] : no mouth breathing [Rash] : no rash

## 2025-03-19 NOTE — HISTORY OF PRESENT ILLNESS
[Mother] : mother [Formula ___ oz/feed] : [unfilled] oz of formula per feed [Formula ___ oz in 24hrs] : [unfilled] oz of formula in 24 hours [Hours between feeds ___] : Child is fed every [unfilled] hours [Vitamins ___] : Patient takes [unfilled] vitamins daily [Normal] : Normal [___ voids per day] : [unfilled] voids per day [Frequency of stools: ___] : Frequency of stools: [unfilled]  stools [per day] : per day. [Yellow] : yellow [Seedy] : seedy [In Bassinet/Crib] : sleeps in bassinet/crib [On back] : sleeps on back [Sleeps 12-16 hours per 24 hours (including naps)] : sleeps 12-16 hours per 24 hours (including naps) [Tummy time] : tummy time [No] : No cigarette smoke exposure [Rear facing car seat in back seat] : Rear facing car seat in back seat [NO] : No [Co-sleeping] : no co-sleeping [Loose bedding, pillow, toys, and/or bumpers in crib] : no loose bedding, pillow, toys, and/or bumpers in crib [Pacifier use] : not using pacifier [Exposure to electronic nicotine delivery system] : No exposure to electronic nicotine delivery system [de-identified] : grandparents [FreeTextEntry7] : Patient had a viral URI [de-identified] : often has her tongue sticking out [de-identified] : Similac 360 [FreeTextEntry8] : brown, soft [FreeTextEntry3] : Using a swaddle

## 2025-03-19 NOTE — PHYSICAL EXAM
[Alert] : alert [Playful] : playful [Flat Open Anterior [place]] : flat open anterior fontanelle [Red Reflex] : red reflex bilateral [PERRL] : PERRL [Normally Placed Ears] : normally placed ears [Auricles Well Formed] : auricles well formed [Bony landmarks visible] : bony landmarks visible [Nares Patent] : nares patent [Palate Intact] : palate intact [Uvula Midline] : uvula midline [Symmetric Chest Rise] : symmetric chest rise [Clear to Auscultation Bilaterally] : clear to auscultation bilaterally [Regular Rate and Rhythm] : regular rate and rhythm [S1, S2 present] : S1, S2 present [+2 Femoral Pulses] : (+) 2 femoral pulses [Soft] : soft [Bowel Sounds] : bowel sounds present [Normal External Genitalia] : normal external genitalia [Normal Vaginal Introitus] : normal vaginal introitus [Patent] : patent [Normally Placed] : normally placed [No Abnormal Lymph Nodes Palpated] : no abnormal lymph nodes palpated [Startle Reflex] : startle reflex present [Plantar Grasp] : plantar grasp reflex present [Symmetric Ayesha] : symmetric ayesha [Rash or Lesions] : rash and/or lesion present [Acute Distress] : no acute distress [Discharge] : no discharge [Palpable Masses] : no palpable masses [Murmurs] : no murmurs [Tender] : nontender [Distended] : nondistended [Hepatomegaly] : no hepatomegaly [Splenomegaly] : no splenomegaly [Clitoromegaly] : no clitoromegaly [Valenzuela-Ortolani] : negative Valenzuela-Ortolani [Allis Sign] : negative Allis sign [Spinal Dimple] : no spinal dimple [Tuft of Hair] : no tuft of hair [FreeTextEntry2] : plagiocephaly [de-identified] : strength equal in bilateral arms [de-identified] : Multiple mildly erythematous patchy rashes over abdomen and on facial scalp line.

## 2025-03-28 NOTE — DISCUSSION/SUMMARY
[FreeTextEntry1] : Whitney is 4M old p/w hospital followup after admission for dehydration  #Dehydration, resolved - Drinking full strength feeds   #Flu  - Last tamiflu 3/27/25  - No fevers   #Diaper Rashes  - Nystatin sent to pharmacy  Given return preacuations

## 2025-03-28 NOTE — REVIEW OF SYSTEMS
[Rash] : rash [Negative] : Genitourinary [Dry Skin] : no dry skin [Itching] : no itching [Seborrhea] : no seborrhea [Hives] : no hives

## 2025-03-28 NOTE — HISTORY OF PRESENT ILLNESS
[FreeTextEntry6] : 4M old recently a/f dehydration iso Flu+ from 3/22-3/24. Since she has returned home there have been no issues. No fever. Normal behavior. Last day of tamiflu today. Improved PO to tolerating full strength total care Sim 360 6oz every 3 hrs. Normal stool and urine. Requires Nystatin. No other concerns.

## 2025-05-21 NOTE — PHYSICAL EXAM
[Alert] : alert [Normocephalic] : normocephalic [Flat Open Anterior [place]] : flat open anterior fontanelle [Red Reflex] : red reflex bilateral [PERRL] : PERRL [Normally Placed Ears] : normally placed ears [Auricles Well Formed] : auricles well formed [Clear Tympanic membranes] : clear tympanic membranes [Light reflex present] : light reflex present [Bony landmarks visible] : bony landmarks visible [Nares Patent] : nares patent [Palate Intact] : palate intact [Uvula Midline] : uvula midline [Supple, full passive range of motion] : supple, full passive range of motion [Symmetric Chest Rise] : symmetric chest rise [Clear to Auscultation Bilaterally] : clear to auscultation bilaterally [Regular Rate and Rhythm] : regular rate and rhythm [S1, S2 present] : S1, S2 present [+2 Femoral Pulses] : (+) 2 femoral pulses [Soft] : soft [Bowel Sounds] : bowel sounds present [Normal External Genitalia] : normal external genitalia [Normal Vaginal Introitus] : normal vaginal introitus [Patent] : patent [Normally Placed] : normally placed [No Abnormal Lymph Nodes Palpated] : no abnormal lymph nodes palpated [Symmetric Buttocks Creases] : symmetric buttocks creases [Plantar Grasp] : plantar grasp reflex present [Cranial Nerves Grossly Intact] : cranial nerves grossly intact [Rash or Lesions] : rash and/or lesion present [Acute Distress] : no acute distress [Discharge] : no discharge [Tooth Eruption] : no tooth eruption [Palpable Masses] : no palpable masses [Murmurs] : no murmurs [Tender] : nontender [Distended] : nondistended [Hepatomegaly] : no hepatomegaly [Splenomegaly] : no splenomegaly [Clitoromegaly] : no clitoromegaly [Valenzuela-Ortolani] : negative Valenzuela-Ortolani [Allis Sign] : negative Allis sign [Spinal Dimple] : no spinal dimple [Tuft of Hair] : no tuft of hair [de-identified] : Interactive, Plagiocephaly  [de-identified] : small erythematous dry patches on arms, legs, and stomach, small faint red papules in  area

## 2025-05-21 NOTE — HISTORY OF PRESENT ILLNESS
[Formula ___ oz/feed] : [unfilled] oz of formula per feed [Hours between feeds ___] : Child is fed every [unfilled] hours [Normal] : Normal [___ voids per day] : [unfilled] voids per day [Frequency of stools: ___] : Frequency of stools: [unfilled]  stools [per day] : per day. [In Bassinet/Crib] : sleeps in bassinet/crib [On back] : sleeps on back [Sleeps 12-16 hours per 24 hours (including naps)] : sleeps 12-16 hours per 24 hours (including naps) [Pacifier use] : Pacifier use [Tummy time] : tummy time [No] : No cigarette smoke exposure [Water heater temperature set at <120 degrees F] : Water heater temperature set at <120 degrees F [Rear facing car seat in back seat] : Rear facing car seat in back seat [Carbon Monoxide Detectors] : Carbon monoxide detectors at home [Smoke Detectors] : Smoke detectors at home. [NO] : No [Co-sleeping] : no co-sleeping [Loose bedding, pillow, toys, and/or bumpers in crib] : no loose bedding, pillow, toys, and/or bumpers in crib [Screen time only for video chatting] : screen time not just for video chatting [Exposure to electronic nicotine delivery system] : No exposure to electronic nicotine delivery system [de-identified] : Admitted to hospital for dehydration i/s/o Flu (3/22-3/24); Seen in clinic s/p discharge and was back to baseline. Concerns for Diaper rash at that time, given Nystatin with improvement. [de-identified] : No concerns [de-identified] : Has no tried solid foods yet [de-identified] : Due for 6 month vaccinations today

## 2025-05-21 NOTE — DEVELOPMENTAL MILESTONES
[Normal Development] : Normal Development [None] : none [Pats or smiles at reflection] : pats or smiles at reflection [Begins to turn when name called] : begins to turn when name called [Babbles] : babbles [Rolls over prone to supine] : rolls over prone to supine [Sits briefly without support] : sits briefly without support [Rakes small object with 4 fingers] : rakes small object with 4 fingers [[place] small object on surface] : bangs small object on surface [No] : Not Completed. [Reaches for object and transfers] : does not reach for object and transfer [FreeTextEntry1] : Mom not at appt. Patient at appt with grandmother and grandfather

## 2025-05-21 NOTE — DISCUSSION/SUMMARY
[] : The components of the vaccine(s) to be administered today are listed in the plan of care. The disease(s) for which the vaccine(s) are intended to prevent and the risks have been discussed with the caretaker.  The risks are also included in the appropriate vaccination information statements which have been provided to the patient's caregiver.  The caregiver has given consent to vaccinate. [Family Functioning] : family functioning [Nutrition and Feeding] : nutrition and feeding [Infant Development] : infant development [Oral Health] : oral health [Safety] : safety [FreeTextEntry1] : Whitney is a healthy 6m old F, hx of shoulder dystocia, here for her C. She is growing and developing appropriately. Encouraged family to start trialing solid foods, including common allergens- advised no honey until 12 months of age. No elimination or sleep concerns. Hx of candidal diaper rash at previous visit s/p Nystatin, improved. Encouraged to use zinc oxide for more benign diaper rashes. Encouraged to continue dermatology recommended skin care regimen for eczematous patches on arms, legs, and abdomen.   #Hx of L Shoulder Dystocia  - Strength in b/l shoulders is equal - PT once a week, script sent today for renewal  - Last seen by Neuro in December 2024, PRN follow up discussed at the visit  #Health Maintenace: - 7.27kg today, gained 770g since last visit, +14g/day - Developmentally Appropriate  - Vaccines due and received today - [id number], PCV, Rotavirus. Hep B - Anticipatory Guidance Recommend breastfeeding, 8-12 feedings per day. If formula is needed, 2-4 oz every 3-4 hrs. Introduce single-ingredient foods rich in iron, one at a time. Incorporate up to 4 oz of fluorinated water daily in a sippy cup. When teeth erupt wipe daily with washcloth. When in car, patient should be in rear-facing car seat in back seat. Put baby to sleep on back, in own crib with no loose or soft bedding. Lower crib mattress. Help baby to maintain sleep and feeding routines. May offer pacifier if needed. Continue tummy time when awake. Ensure home is safe since baby is now more mobile. Do not use infant walker. Read aloud to baby. -RTC in 3 months or sooner as needed.